# Patient Record
Sex: FEMALE | Race: WHITE | NOT HISPANIC OR LATINO | Employment: FULL TIME | URBAN - METROPOLITAN AREA
[De-identification: names, ages, dates, MRNs, and addresses within clinical notes are randomized per-mention and may not be internally consistent; named-entity substitution may affect disease eponyms.]

---

## 2017-01-06 ENCOUNTER — HOSPITAL ENCOUNTER (INPATIENT)
Facility: MEDICAL CENTER | Age: 22
LOS: 1 days | End: 2017-01-07
Attending: OBSTETRICS & GYNECOLOGY | Admitting: OBSTETRICS & GYNECOLOGY
Payer: MEDICAID

## 2017-01-06 LAB
BASOPHILS # BLD AUTO: 0.2 % (ref 0–1.8)
BASOPHILS # BLD: 0.04 K/UL (ref 0–0.12)
EOSINOPHIL # BLD AUTO: 0.03 K/UL (ref 0–0.51)
EOSINOPHIL NFR BLD: 0.2 % (ref 0–6.9)
ERYTHROCYTE [DISTWIDTH] IN BLOOD BY AUTOMATED COUNT: 41.5 FL (ref 35.9–50)
HCT VFR BLD AUTO: 42.3 % (ref 37–47)
HGB BLD-MCNC: 14.4 G/DL (ref 12–16)
HOLDING TUBE BB 8507: NORMAL
IMM GRANULOCYTES # BLD AUTO: 0.23 K/UL (ref 0–0.11)
IMM GRANULOCYTES NFR BLD AUTO: 1.3 % (ref 0–0.9)
LYMPHOCYTES # BLD AUTO: 1.46 K/UL (ref 1–4.8)
LYMPHOCYTES NFR BLD: 8.3 % (ref 22–41)
MCH RBC QN AUTO: 31.6 PG (ref 27–33)
MCHC RBC AUTO-ENTMCNC: 34 G/DL (ref 33.6–35)
MCV RBC AUTO: 92.8 FL (ref 81.4–97.8)
MONOCYTES # BLD AUTO: 0.99 K/UL (ref 0–0.85)
MONOCYTES NFR BLD AUTO: 5.6 % (ref 0–13.4)
NEUTROPHILS # BLD AUTO: 14.88 K/UL (ref 2–7.15)
NEUTROPHILS NFR BLD: 84.4 % (ref 44–72)
NRBC # BLD AUTO: 0 K/UL
NRBC BLD AUTO-RTO: 0 /100 WBC
PLATELET # BLD AUTO: 243 K/UL (ref 164–446)
PMV BLD AUTO: 10.2 FL (ref 9–12.9)
RBC # BLD AUTO: 4.56 M/UL (ref 4.2–5.4)
WBC # BLD AUTO: 17.6 K/UL (ref 4.8–10.8)

## 2017-01-06 PROCEDURE — 85025 COMPLETE CBC W/AUTO DIFF WBC: CPT

## 2017-01-06 PROCEDURE — A9270 NON-COVERED ITEM OR SERVICE: HCPCS | Performed by: FAMILY MEDICINE

## 2017-01-06 PROCEDURE — 700111 HCHG RX REV CODE 636 W/ 250 OVERRIDE (IP): Performed by: FAMILY MEDICINE

## 2017-01-06 PROCEDURE — 700112 HCHG RX REV CODE 229: Performed by: FAMILY MEDICINE

## 2017-01-06 PROCEDURE — 700111 HCHG RX REV CODE 636 W/ 250 OVERRIDE (IP)

## 2017-01-06 PROCEDURE — 304965 HCHG RECOVERY SERVICES

## 2017-01-06 PROCEDURE — 36415 COLL VENOUS BLD VENIPUNCTURE: CPT

## 2017-01-06 PROCEDURE — 700102 HCHG RX REV CODE 250 W/ 637 OVERRIDE(OP): Performed by: FAMILY MEDICINE

## 2017-01-06 PROCEDURE — 770007 HCHG ROOM/CARE - OB SEMI PRIVATE (*

## 2017-01-06 PROCEDURE — 10907ZC DRAINAGE OF AMNIOTIC FLUID, THERAPEUTIC FROM PRODUCTS OF CONCEPTION, VIA NATURAL OR ARTIFICIAL OPENING: ICD-10-PCS | Performed by: OBSTETRICS & GYNECOLOGY

## 2017-01-06 PROCEDURE — 303615 HCHG EPIDURAL/SPINAL ANESTHESIA FOR LABOR

## 2017-01-06 PROCEDURE — 59409 OBSTETRICAL CARE: CPT

## 2017-01-06 RX ORDER — METHYLERGONOVINE MALEATE 0.2 MG/ML
0.2 INJECTION INTRAVENOUS PRN
Status: DISCONTINUED | OUTPATIENT
Start: 2017-01-06 | End: 2017-01-07 | Stop reason: HOSPADM

## 2017-01-06 RX ORDER — DOCUSATE SODIUM 100 MG/1
100 CAPSULE, LIQUID FILLED ORAL 2 TIMES DAILY PRN
Status: DISCONTINUED | OUTPATIENT
Start: 2017-01-06 | End: 2017-01-07 | Stop reason: HOSPADM

## 2017-01-06 RX ORDER — ONDANSETRON 2 MG/ML
4 INJECTION INTRAMUSCULAR; INTRAVENOUS EVERY 6 HOURS PRN
Status: DISCONTINUED | OUTPATIENT
Start: 2017-01-06 | End: 2017-01-06

## 2017-01-06 RX ORDER — MISOPROSTOL 200 UG/1
1000 TABLET ORAL PRN
Status: DISCONTINUED | OUTPATIENT
Start: 2017-01-06 | End: 2017-01-07 | Stop reason: HOSPADM

## 2017-01-06 RX ORDER — ACETAMINOPHEN 325 MG/1
325 TABLET ORAL EVERY 4 HOURS PRN
Status: DISCONTINUED | OUTPATIENT
Start: 2017-01-06 | End: 2017-01-07 | Stop reason: HOSPADM

## 2017-01-06 RX ORDER — ROPIVACAINE HYDROCHLORIDE 2 MG/ML
INJECTION, SOLUTION EPIDURAL; INFILTRATION; PERINEURAL
Status: COMPLETED
Start: 2017-01-06 | End: 2017-01-06

## 2017-01-06 RX ORDER — BISACODYL 10 MG
10 SUPPOSITORY, RECTAL RECTAL PRN
Status: DISCONTINUED | OUTPATIENT
Start: 2017-01-06 | End: 2017-01-07 | Stop reason: HOSPADM

## 2017-01-06 RX ORDER — ACETAMINOPHEN 325 MG/1
325 TABLET ORAL EVERY 4 HOURS PRN
Status: DISCONTINUED | OUTPATIENT
Start: 2017-01-06 | End: 2017-01-06

## 2017-01-06 RX ORDER — METHYLERGONOVINE MALEATE 0.2 MG/ML
0.2 INJECTION INTRAVENOUS
Status: DISCONTINUED | OUTPATIENT
Start: 2017-01-06 | End: 2017-01-06

## 2017-01-06 RX ORDER — SODIUM CHLORIDE, SODIUM LACTATE, POTASSIUM CHLORIDE, CALCIUM CHLORIDE 600; 310; 30; 20 MG/100ML; MG/100ML; MG/100ML; MG/100ML
INJECTION, SOLUTION INTRAVENOUS CONTINUOUS
Status: DISCONTINUED | OUTPATIENT
Start: 2017-01-06 | End: 2017-01-06

## 2017-01-06 RX ORDER — HYDROCODONE BITARTRATE AND ACETAMINOPHEN 5; 325 MG/1; MG/1
1 TABLET ORAL EVERY 4 HOURS PRN
Status: DISCONTINUED | OUTPATIENT
Start: 2017-01-06 | End: 2017-01-06

## 2017-01-06 RX ORDER — IBUPROFEN 800 MG/1
800 TABLET ORAL EVERY 8 HOURS PRN
Status: DISCONTINUED | OUTPATIENT
Start: 2017-01-06 | End: 2017-01-07 | Stop reason: HOSPADM

## 2017-01-06 RX ORDER — VITAMIN A ACETATE, BETA CAROTENE, ASCORBIC ACID, CHOLECALCIFEROL, .ALPHA.-TOCOPHEROL ACETATE, DL-, THIAMINE MONONITRATE, RIBOFLAVIN, NIACINAMIDE, PYRIDOXINE HYDROCHLORIDE, FOLIC ACID, CYANOCOBALAMIN, CALCIUM CARBONATE, FERROUS FUMARATE, ZINC OXIDE, CUPRIC OXIDE 3080; 12; 120; 400; 1; 1.84; 3; 20; 22; 920; 25; 200; 27; 10; 2 [IU]/1; UG/1; MG/1; [IU]/1; MG/1; MG/1; MG/1; MG/1; MG/1; [IU]/1; MG/1; MG/1; MG/1; MG/1; MG/1
1 TABLET, FILM COATED ORAL EVERY MORNING
Status: DISCONTINUED | OUTPATIENT
Start: 2017-01-07 | End: 2017-01-07 | Stop reason: HOSPADM

## 2017-01-06 RX ORDER — IBUPROFEN 600 MG/1
600 TABLET ORAL EVERY 6 HOURS PRN
Status: DISCONTINUED | OUTPATIENT
Start: 2017-01-06 | End: 2017-01-06

## 2017-01-06 RX ORDER — OXYCODONE AND ACETAMINOPHEN 10; 325 MG/1; MG/1
1 TABLET ORAL EVERY 4 HOURS PRN
Status: DISCONTINUED | OUTPATIENT
Start: 2017-01-06 | End: 2017-01-06

## 2017-01-06 RX ORDER — OXYCODONE HYDROCHLORIDE AND ACETAMINOPHEN 5; 325 MG/1; MG/1
2 TABLET ORAL EVERY 4 HOURS PRN
Status: DISCONTINUED | OUTPATIENT
Start: 2017-01-06 | End: 2017-01-07 | Stop reason: HOSPADM

## 2017-01-06 RX ORDER — ONDANSETRON 2 MG/ML
4 INJECTION INTRAMUSCULAR; INTRAVENOUS EVERY 6 HOURS PRN
Status: DISCONTINUED | OUTPATIENT
Start: 2017-01-06 | End: 2017-01-07 | Stop reason: HOSPADM

## 2017-01-06 RX ORDER — ALUMINA, MAGNESIA, AND SIMETHICONE 2400; 2400; 240 MG/30ML; MG/30ML; MG/30ML
30 SUSPENSION ORAL EVERY 6 HOURS PRN
Status: DISCONTINUED | OUTPATIENT
Start: 2017-01-06 | End: 2017-01-06

## 2017-01-06 RX ORDER — HYDROCODONE BITARTRATE AND ACETAMINOPHEN 5; 325 MG/1; MG/1
1 TABLET ORAL EVERY 4 HOURS PRN
Status: DISCONTINUED | OUTPATIENT
Start: 2017-01-06 | End: 2017-01-07 | Stop reason: HOSPADM

## 2017-01-06 RX ORDER — DEXTROSE, SODIUM CHLORIDE, SODIUM LACTATE, POTASSIUM CHLORIDE, AND CALCIUM CHLORIDE 5; .6; .31; .03; .02 G/100ML; G/100ML; G/100ML; G/100ML; G/100ML
INJECTION, SOLUTION INTRAVENOUS CONTINUOUS
Status: DISCONTINUED | OUTPATIENT
Start: 2017-01-06 | End: 2017-01-06

## 2017-01-06 RX ORDER — MISOPROSTOL 200 UG/1
1000 TABLET ORAL
Status: DISCONTINUED | OUTPATIENT
Start: 2017-01-06 | End: 2017-01-06

## 2017-01-06 RX ADMIN — ROPIVACAINE HYDROCHLORIDE 10 ML/HR: 2 INJECTION, SOLUTION EPIDURAL; INFILTRATION at 11:34

## 2017-01-06 RX ADMIN — ONDANSETRON 4 MG: 2 INJECTION, SOLUTION INTRAMUSCULAR; INTRAVENOUS at 12:42

## 2017-01-06 RX ADMIN — OXYTOCIN 125 ML/HR: 10 INJECTION INTRAVENOUS at 16:27

## 2017-01-06 RX ADMIN — SODIUM CHLORIDE, POTASSIUM CHLORIDE, SODIUM LACTATE AND CALCIUM CHLORIDE: 600; 310; 30; 20 INJECTION, SOLUTION INTRAVENOUS at 09:55

## 2017-01-06 RX ADMIN — DOCUSATE SODIUM 100 MG: 100 CAPSULE ORAL at 19:46

## 2017-01-06 RX ADMIN — IBUPROFEN 800 MG: 800 TABLET, FILM COATED ORAL at 19:47

## 2017-01-06 RX ADMIN — FENTANYL CITRATE 100 MCG: 50 INJECTION, SOLUTION INTRAMUSCULAR; INTRAVENOUS at 10:00

## 2017-01-06 RX ADMIN — SODIUM CHLORIDE, SODIUM LACTATE, POTASSIUM CHLORIDE, CALCIUM CHLORIDE AND DEXTROSE MONOHYDRATE: 5; 600; 310; 30; 20 INJECTION, SOLUTION INTRAVENOUS at 12:42

## 2017-01-06 RX ADMIN — SODIUM CHLORIDE, POTASSIUM CHLORIDE, SODIUM LACTATE AND CALCIUM CHLORIDE: 600; 310; 30; 20 INJECTION, SOLUTION INTRAVENOUS at 10:58

## 2017-01-06 ASSESSMENT — PAIN SCALES - GENERAL
PAINLEVEL_OUTOF10: 4
PAINLEVEL_OUTOF10: 4
PAINLEVEL_OUTOF10: 0

## 2017-01-06 ASSESSMENT — PATIENT HEALTH QUESTIONNAIRE - PHQ9
2. FEELING DOWN, DEPRESSED, IRRITABLE, OR HOPELESS: NOT AT ALL
SUM OF ALL RESPONSES TO PHQ9 QUESTIONS 1 AND 2: 0
SUM OF ALL RESPONSES TO PHQ QUESTIONS 1-9: 0
1. LITTLE INTEREST OR PLEASURE IN DOING THINGS: NOT AT ALL

## 2017-01-06 ASSESSMENT — COPD QUESTIONNAIRES
DO YOU EVER COUGH UP ANY MUCUS OR PHLEGM?: NO/ONLY WITH OCCASIONAL COLDS OR INFECTIONS
DURING THE PAST 4 WEEKS HOW MUCH DID YOU FEEL SHORT OF BREATH: NONE/LITTLE OF THE TIME
HAVE YOU SMOKED AT LEAST 100 CIGARETTES IN YOUR ENTIRE LIFE: NO/DON'T KNOW

## 2017-01-06 ASSESSMENT — LIFESTYLE VARIABLES
EVER_SMOKED: YES
DO YOU DRINK ALCOHOL: NO
ALCOHOL_USE: NO

## 2017-01-06 NOTE — IP AVS SNAPSHOT
After Visit Summary                                                                                                                Sarah Lau   MRN: [id number]    Department:  POST PARTUM 31   2017           Follow-up Information     1. Follow up with Radha Whiting M.D. In 5 weeks.    Specialty:  OB/Gyn    Contact information    [address] [place] [address]  [place] [address]  [phone]         I assume responsibility for securing a follow-up  Screening blood test on my baby within the specified date range.    -                  Discharge Instructions       POSTPARTUM DISCHARGE INSTRUCTIONS FOR MOM    YOB: 1995   Age: 21 y.o.               Admit Date: 2017     Discharge Date: 2017  Attending Doctor:  Navin Altamirano M.D.                  Allergies:  Review of patient's allergies indicates no known allergies.    Discharged to home by car. Discharged via wheelchair, hospital escort: Yes.  Special equipment needed: Not Applicable  Belongings with: Personal  Be sure to schedule a follow-up appointment with your primary care doctor or any specialists as instructed.     Discharge Plan:   Influenza Vaccine Indication: Patient Refuses    REASONS TO CALL YOUR OBSTETRICIAN:  1.   Persistent fever or shaking chills (Temperature higher than 100.4)  2.   Heavy bleeding (soaking more than 1 pad per hour); Passing clots  3.   Foul odor from vagina  4.   Mastitis (Breast infection; breast pain, chills, fever, redness)  5.   Urinary pain, burning or frequency  6.   Episiotomy infection  7.   Abdominal incision infection  8.   Severe depression longer than 24 hours    HAND WASHING  · Prior to handling the baby.  · Before breastfeeding or bottle feeding baby.  · After using the bathroom or changing the baby's diaper.    WOUND CARE  Ask your physician for additional care instructions.  In general:    ·  Incision:      · Keep clean and dry.    · Do NOT lift anything heavier than your baby for up  "to 6 weeks.    · There should not be any opening or pus.      VAGINAL CARE  · Nothing inside vagina for 6 weeks: no sexual intercourse, tampons or douching.  · Bleeding may continue for 2-4 weeks.  Amount may vary.    · Call your physician for heavy bleeding which means soaking more than 1 pad per hour    BIRTH CONTROL  · It is possible to become pregnant at any time after delivery and while breastfeeding.  · Plan to discuss a method of birth control with your physician at your follow up visit. visit.    DIET AND ELIMINATION  · Eating more fiber (bran cereal, fruits, and vegetables) and drinking plenty of fluids will help to avoid constipation.  · Urinary frequency after childbirth is normal.    POSTPARTUM BLUES  During the first few days after birth, you may experience a sense of the \"blues\" which may include impatience, irritability or even crying.  These feeling come and go quickly.  However, as many as 1 in 10 women experience emotional symptoms known as postpartum depression.    Postpartum depression:  May start as early as the second or third day after delivery or take several weeks or months to develop.  Symptoms of \"blues\" are present, but are more intense:  Crying spells; loss of appetite; feelings of hopelessness or loss of control; fear of touching the baby; over concern or no concern at all about the baby; little or no concern about your own appearance/caring for yourself; and/or inability to sleep or excessive sleeping.  Contact your physician if you are experiencing any of these symptoms.    Crisis Hotline:  · [place] Crisis Hotline:  3-862-JVJHBGL  Or 1-464.706.1163  · [place] Crisis Hotline:  1-637.376.3815  Or 543-135-5654    PREVENTING SHAKEN BABY:  If you are angry or stressed, PUT THE BABY IN THE CRIB, step away, take some deep breaths, and wait until you are calm to care for the baby.  DO NOT SHAKE THE BABY.  You are not alone, call a supporter for help.    · Crisis Call Center 24/7 crisis line " "474.664.5053 or 1-136.167.7405  · You can also text them, text \"ANSWER\" to 438438    QUIT SMOKING/TOBACCO USE:  I understand the use of any tobacco products increases my chance of suffering from future heart disease and could cause other illnesses which may shorten my life. Quitting the use of tobacco products is the single most important thing I can do to improve my health. For further information on smoking / tobacco cessation call a Toll Free Quit Line at 1-767.910.8325 (*National Cancer [place]) or 1-734.559.2732 (American Lung Association) or you can access the web based program at www.lungusa.org.    · [place] Tobacco Users Help Line:  (499) 889-4662       Toll Free: 1-961.947.2356  · Quit Tobacco Program [facility] (467)340-4773    DEPRESSION / SUICIDE RISK:  As you are discharged from this [facility], it is important to learn how to keep safe from harming yourself.    Recognize the warning signs:  · Abrupt changes in personality, positive or negative- including increase in energy   · Giving away possessions  · Change in eating patterns- significant weight changes-  positive or negative  · Change in sleeping patterns- unable to sleep or sleeping all the time   · Unwillingness or inability to communicate  · Depression  · Unusual sadness, discouragement and loneliness  · Talk of wanting to die  · Neglect of personal appearance   · Rebelliousness- reckless behavior  · Withdrawal from people/activities they love  · Confusion- inability to concentrate     If you or a loved one observes any of these behaviors or has concerns about self-harm, here's what you can do:  · Talk about it- your feelings and reasons for harming yourself  · Remove any means that you might use to hurt yourself (examples: pills, rope, extension cords, firearm)  · Get professional help from the community (Mental Health, Substance Abuse, psychological counseling)  · Do not be alone:Call your Safe Contact- " someone whom you trust who will be there for you.  · Call your local CRISIS HOTLINE 584-3849 or 739-374-7143  · Call your local Children's Mobile Crisis Response Team [place] (189) 518-0925 or www.CommonFloor  · Call the toll free National Suicide Prevention Hotlines   · National Suicide Prevention Lifeline 772-612-CEZM (9364)  · National Hope Line Network 541-SUICIDE (207-2117)    DISCHARGE SURVEY:  Thank you for choosing [facility].  We hope we provided you with very good care.  You may be receiving a survey in the mail.  Please fill it out.  Your opinion is valuable to us.    ADDITIONAL EDUCATIONAL MATERIALS GIVEN TO PATIENT:        My signature on this form indicates that:  1.  I have reviewed and understand the above information  2.  My questions regarding this information have been answered to my satisfaction.  3.  I have formulated a plan with my discharge nurse to obtain my prescribed medication for home.         Discharge Medication Instructions:    Below are the medications your physician expects you to take upon discharge:    Review all your home medications and newly ordered medications with your doctor and/or pharmacist. Follow medication instructions as directed by your doctor and/or pharmacist.    Please keep your medication list with you and share with your physician.               Medication List      START taking these medications        Instructions     MG Caps   Last time this was given:  100 mg on 1/6/2017  7:46 PM    Take 100 mg by mouth 2 times a day as needed for Constipation.   Dose:  100 mg       ibuprofen 800 MG Tabs   Last time this was given:  800 mg on 1/7/2017  4:29 AM   Commonly known as:  MOTRIN    Take 1 Tab by mouth every 8 hours as needed (Cramping).   Dose:  800 mg         CONTINUE taking these medications        Instructions    PRENATAL VITAMINS PO    Take  by mouth.               Crisis Hotline:     National Crisis Hotline:  9-470-EGYNGQD or  1-461.890.1965    [place] Crisis Hotline:    1-662.343.4778 or 369-431-7665        Disclaimer           _____________________________________                     __________       ________       Patient/Mother Signature or Legal                          Date                   Time

## 2017-01-06 NOTE — DELIVERY NOTE
[facility] SPONTANEOUS VAGINAL DELIVERY PRODEDURE NOTE    PATIENT ID:  NAME:  Sarah Lau  MRN:               [id number]  YOB: 1995    On 2017 at 14:18, this 21 y.o., now 39w5d , GBS negative female delivered via  under spinal anesthesia a viable female infant weight pending  with APGAR scores of 8 and 9 at one and five minutes. There was no nuchal cord/There was a single nuchal cord which was reduced and infant was bulb suctioned at delivery. Cord was doubly clamped, cut and infant handed to RN in attendance. An intact placenta was delivered spontaneously at 15:02 with 3 vessel cord. Upon vaginal exam, there was right labial laceration which did not require any repair. Estimated blood loss was 300cc. Patient will be transferred to postpartum in stable condition and infant to  nursery.    Austen Polo M.D.    Delivery attended by Dr. Suarez who was present for the entire delivery.

## 2017-01-06 NOTE — IP AVS SNAPSHOT
1/7/2017          Sarah Lau  [address]  [place] [address]    Dear Sarah:    [facility] wants to ensure your discharge home is safe and you or your loved ones have had all your questions answered regarding your care after you leave the hospital.    You may receive a telephone call within two days of your discharge.  This call is to make certain you understand your discharge instructions as well as ensure we provided you with the best care possible during your stay with us.     The call will only last approximately 3-5 minutes and will be done by a nurse.    Once again, we want to ensure your discharge home is safe and that you have a clear understanding of any next steps in your care.  If you have any questions or concerns, please do not hesitate to contact us, we are here for you.  Thank you for choosing [facility] for your healthcare needs.    Sincerely,    Man Al    [facility]

## 2017-01-06 NOTE — H&P
[facility] LABOR AND DELIVERY HISTORY AND PHYSICAL    PATIENT ID:  NAME:  Sarah Lau  MRN:               [id number]  YOB: 1995    CC:  CTX    HPI:  Sarah Lau is a 21 y.o. female  at 39w5d by a 14 week ultrasound/LMP on Patient's last menstrual period was 2016.. Estimated Date of Delivery: 17  Patient presents complaining of positive uterine contractions, with negative loss of fluid.  Good fetal movement.  negative vaginal bleeding.  Pregnancy was uncomplicated thus far.    ROS: Patient denies any fever chills, nausea, vomiting, headache, chest pain, shortness of breath, or dysuria or unusual swelling of hands or feet.     Prenatal Care: Obtained at [facility], 1st visit @30w (transfer from [facility] [place] [facility]) with 8 total visits.  Third trimester BPs were approximately 110-120s/70s.  Total weight gain 24lbs during the pregnancy.    Prenatal Labs:   HepBsAg: Neg HIV: NR Rubella: immune   RPR: NR PAP: ASCUS GBS: Neg   GC/CT: Neg A+/ Ab neg Quad Screen: normal   No results for input(s): WBC, RBC, HEMOGLOBIN, HEMATOCRIT, MCV, MCH, RDW, PLATELETCT, MPV, NEUTSPOLYS, LYMPHOCYTES, MONOCYTES, EOSINOPHILS, BASOPHILS, RBCMORPHOLO in the last 72 hours.  No results for input(s): SODIUM, POTASSIUM, CHLORIDE, CO2, GLUCOSE, BUN, CPKTOTAL in the last 72 hours.       IMAGING:   OB ultrasound:   2016 2:37 PM    HISTORY/REASON FOR EXAM:  Followup CPCs    TECHNIQUE/EXAM DESCRIPTION: OB limited ultrasound.    COMPARISON:  Outside obstetrical ultrasound 2016    FINDINGS:  Fetal Lie:  Vertex    Placenta (Location):  Anterior  Placenta Previa: No    Amniotic Fluid Volume:  FLOR = 18.22 cm    Fetal Heart Rate:  154 bpm    No maternal adnexal mass is identified.    Fetal Biometry  BPD    8.57 cm, 34 weeks, 4 days  HC    31.14 cm, 34 weeks, 6 days  AC    31.61 cm, 35 weeks, 4 days  Femur Length    6.66 cm, 34 weeks, 2 days  Humerus Length    5.89 cm, 34 weeks, 1 day    EGA by this US:  34 weeks, 5 days  AMADO by this US:  "2017  AMADO by 1st US:  2017 - [facility]    Estimated Fetal Weight:  2574 grams +/- 376g    Comments:  No choroid plexus cysts identified.         Impression        Single intrauterine pregnancy of an estimated gestational age of 34 weeks, 5 days with an estimated date of delivery of 2017.    Size less than dates.         POB Hx:  OB History    Para Term  AB SAB TAB Ectopic Multiple Living   1               # Outcome Date GA Lbr Nick/2nd Weight Sex Delivery Anes PTL Lv   1 Current                   PMH/Problem List:    Past Medical History   Diagnosis Date   • Asthma 2006     no medications   • Migraine      no medications     Patient Active Problem List    Diagnosis Date Noted   • Encounter for supervision of normal first pregnancy in third trimester 2016       Current Outpatient Medications:  No current facility-administered medications on file prior to encounter.     Current Outpatient Prescriptions on File Prior to Encounter   Medication Sig Dispense Refill   • Prenatal Multivit-Min-Fe-FA (PRENATAL VITAMINS PO) Take  by mouth.         PSH:    No past surgical history on file.    Allergies:   No Known Allergies    SH:  Social History     Social History   • Marital Status: Single     Spouse Name: N/A   • Number of Children: N/A   • Years of Education: N/A     Occupational History   • Not on file.     Social History Main Topics   • Smoking status: Never Smoker    • Smokeless tobacco: Never Used   • Alcohol Use: No   • Drug Use: No   • Sexual Activity:     Partners: Male      Comment: none. Planned pregnancy     Other Topics Concern   • Not on file     Social History Narrative         PHYSICAL EXAM:  Filed Vitals:    17 0700 17 0800   BP:  123/75   Pulse:  88   Temp:  36.4 °C (97.6 °F)   TempSrc:  Temporal   Resp:  18   Height: 1.676 m (5' 6\")    Weight: 63.957 kg (141 lb)      Temp (24hrs), Av.4 °C (97.6 °F), Min:36.4 °C (97.6 °F), Max:36.4 °C (97.6 °F)    General: No acute " distress, resting comfortably in bed.  HEENT: normocephalic, nontraumatic, PERRLA, EOMI  Cardiovascular: Heart RRR with no murmurs, rubs or gallops. Distal Pulses 2+  Respiratory: symmetric chest expansion, lungs CTA bilaterally with no wheezes rales or rhonci  Abdomen: gravid, nontender  Musculoskeletal: strength 5/5 in four extremities  Neuro: non focal with no numbness, tingling or changes in sensation    SVE: 4/100%/-1 with BBOW  [place]: Q5 minutes; EFM: 140s with accels, mod variability    A/P: Intrauterine pregancy at 39w5d weeks in active labor labor here for anticipated .      Patient Active Problem List    Diagnosis Date Noted   • Encounter for supervision of normal first pregnancy in third trimester 2016       1. IUP at term  2. Patient is GBS neg  3. Anticipating     Austen Polo M.D.

## 2017-01-06 NOTE — PROGRESS NOTES
"[facility] LABOR AND DELIVERY PROGRESS NOTE    PATIENT ID:  NAME:  Sarah Lau  MRN:               [id number]  YOB: 1995     21 y.o. female  at 39w5d.    Subjective: Doing well, comfortable, s/p epidural    Objective:    Filed Vitals:    17 0700 17 0800   BP:  123/75   Pulse:  88   Temp:  36.4 °C (97.6 °F)   TempSrc:  Temporal   Resp:  18   Height: 1.676 m (5' 6\")    Weight: 63.957 kg (141 lb)        Cervix:  7cm/90%/-1  [place]: Uterine Contractions Q2-4 minutes.   FHRM: Baseline 140, Accels, no decels, reassuring tracing  Pitocin: none  Pain control: adequate    Assessment: 21 y.o. female    at 39w5d.    Plan:   1. Continue current management  2. Anticipate  delivery  3. AROM @ 1200     Austen Polo M.D.    "

## 2017-01-06 NOTE — IP AVS SNAPSHOT
Zova Access Code: Activation code not generated  Current Zova Status: Patient Declined    [employer]  A secure, online tool to manage your health information     [employer]’s Zova® is a secure, online tool that connects you to your personalized health information from the privacy of your home -- day or night - making it very easy for you to manage your healthcare. Once the activation process is completed, you can even access your medical information using the Zova chuy, which is available for free in the Apple Chuy store or Google Play store.     Zova provides the following levels of access (as shown below):   My Chart Features   [facility] Primary Care Doctor [facility]  Specialists [facility]  Urgent  Care Non-[facility]  Primary Care  Doctor   Email your healthcare team securely and privately 24/7 X X X X   Manage appointments: schedule your next appointment; view details of past/upcoming appointments X      Request prescription refills. X      View recent personal medical records, including lab and immunizations X X X X   View health record, including health history, allergies, medications X X X X   Read reports about your outpatient visits, procedures, consult and ER notes X X X X   See your discharge summary, which is a recap of your hospital and/or ER visit that includes your diagnosis, lab results, and care plan. X X       How to register for Zova:  1. Go to  [email or web address].  2. Click on the Sign Up Now box, which takes you to the New Member Sign Up page. You will need to provide the following information:  a. Enter your Zova Access Code exactly as it appears at the top of this page. (You will not need to use this code after you’ve completed the sign-up process. If you do not sign up before the expiration date, you must request a new code.)   b. Enter your date of birth.   c. Enter your home email address.   d. Click Submit, and follow the next screen’s instructions.  3. Create a Zova ID.  This will be your [employer] login ID and cannot be changed, so think of one that is secure and easy to remember.  4. Create a [employer] password. You can change your password at any time.  5. Enter your Password Reset Question and Answer. This can be used at a later time if you forget your password.   6. Enter your e-mail address. This allows you to receive e-mail notifications when new information is available in [employer].  7. Click Sign Up. You can now view your health information.    For assistance activating your [employer] account, call [phone]

## 2017-01-06 NOTE — PROGRESS NOTES
, due , 39.5 weeks  0945: Report received from JEFFREY Turcios RN. POC discussed. Pt admitted for active labor. Admission procedures completed. Pt requesting an epidural. IVF bolus started, see MAR. VSS. FOB at bedside.  1117: Dr. Hankins at bedside to place epidural.  1202: Dr. Polo at bedside. SVE by MD, /-1. AROM, clear. Pt comfortable with epidural.  1405: Dr. Polo at bedside. SVE by MD, Anterior lip/+2.  1430: Pt called out reporting pressure. SVE by RN, C/+2. Pt placed in stirrups. Begin pushing with RN at bedside.  1447: Dr. Polo & Dr. Suarez at bedside for delivery.   1453:  of viable female infant, 8/9 APGARs, 3VC.  1502: Placenta delivered S/I. No lacerations per MD. Infant skin-to-skin with pulse-ox. Lunch tray ordered.  1725: Pt up to bathroom without difficulty, voided. Fundus/lochia WNL.   1735: Pt and infant transferred to  332 via wheelchair. Report given to DASIA Pierson. POC discussed.

## 2017-01-06 NOTE — PROGRESS NOTES
0745: pt to lda 2, pt c/o uc's starting at 5 am this morning with some spotting and mucous discharge.  efm and toco applied. Vss.  sve 4/100/-1 with bbow.  Report to dr. Polo.    0830: pt to ambulate for 30 minutes per md orders  0900: pt back to bed, sve 5/100/-1 with bbow.  Report to dr. Polo.  Orders to admit to labor and delivery. Pt to room 220.  Report to bharti caldwell rn

## 2017-01-06 NOTE — PROGRESS NOTES
[facility] LABOR AND DELIVERY PROGRESS NOTE    PATIENT ID:  NAME:  Sarah Lau  MRN:               [id number]  YOB: 1995     21 y.o. female  at 39w5d.    Subjective: Doing well, comfortable, not yet feeling pressure.    Objective:    Filed Vitals:    17 1148 17 1151 17 1154 17 1157   BP: 123/58 120/59 120/59 118/56   Pulse: 75 79 70 76   Temp:       TempSrc:       Resp:       Height:       Weight:       SpO2:           Cervix:  complete with anterior lip/100%/+2  [place]: Uterine Contractions Q3 minutes.   FHRM: Baseline 130s, Accels, no decels, reassuring tracing  Pitocin: none  Pain control: good    Assessment: 21 y.o. female    at 39w5d.    Plan:   1. Continue current management  2. Anticipate  delivery       Austen Polo M.D.

## 2017-01-07 VITALS
TEMPERATURE: 97.9 F | DIASTOLIC BLOOD PRESSURE: 74 MMHG | OXYGEN SATURATION: 95 % | HEART RATE: 76 BPM | RESPIRATION RATE: 17 BRPM | SYSTOLIC BLOOD PRESSURE: 103 MMHG | BODY MASS INDEX: 22.66 KG/M2 | WEIGHT: 141 LBS | HEIGHT: 66 IN

## 2017-01-07 LAB
ERYTHROCYTE [DISTWIDTH] IN BLOOD BY AUTOMATED COUNT: 44.4 FL (ref 35.9–50)
HCT VFR BLD AUTO: 35.1 % (ref 37–47)
HGB BLD-MCNC: 11.6 G/DL (ref 12–16)
MCH RBC QN AUTO: 31.4 PG (ref 27–33)
MCHC RBC AUTO-ENTMCNC: 32.5 G/DL (ref 33.6–35)
MCV RBC AUTO: 96.7 FL (ref 81.4–97.8)
PLATELET # BLD AUTO: 208 K/UL (ref 164–446)
PMV BLD AUTO: 10.5 FL (ref 9–12.9)
RBC # BLD AUTO: 3.63 M/UL (ref 4.2–5.4)
WBC # BLD AUTO: 13.4 K/UL (ref 4.8–10.8)

## 2017-01-07 PROCEDURE — 700112 HCHG RX REV CODE 229: Performed by: FAMILY MEDICINE

## 2017-01-07 PROCEDURE — 85027 COMPLETE CBC AUTOMATED: CPT

## 2017-01-07 PROCEDURE — A9270 NON-COVERED ITEM OR SERVICE: HCPCS | Performed by: FAMILY MEDICINE

## 2017-01-07 PROCEDURE — 700102 HCHG RX REV CODE 250 W/ 637 OVERRIDE(OP): Performed by: FAMILY MEDICINE

## 2017-01-07 PROCEDURE — 36415 COLL VENOUS BLD VENIPUNCTURE: CPT

## 2017-01-07 RX ORDER — PSEUDOEPHEDRINE HCL 30 MG
100 TABLET ORAL 2 TIMES DAILY PRN
Qty: 60 CAP | Refills: 2 | Status: SHIPPED | OUTPATIENT
Start: 2017-01-07

## 2017-01-07 RX ORDER — IBUPROFEN 800 MG/1
800 TABLET ORAL EVERY 8 HOURS PRN
Qty: 30 TAB | Refills: 2 | Status: SHIPPED | OUTPATIENT
Start: 2017-01-07

## 2017-01-07 RX ADMIN — HYDROCODONE BITARTRATE AND ACETAMINOPHEN 1 TABLET: 5; 325 TABLET ORAL at 14:05

## 2017-01-07 RX ADMIN — IBUPROFEN 800 MG: 800 TABLET, FILM COATED ORAL at 04:29

## 2017-01-07 RX ADMIN — HYDROCODONE BITARTRATE AND ACETAMINOPHEN 1 TABLET: 5; 325 TABLET ORAL at 19:30

## 2017-01-07 RX ADMIN — DOCUSATE SODIUM 100 MG: 100 CAPSULE ORAL at 19:30

## 2017-01-07 RX ADMIN — HYDROCODONE BITARTRATE AND ACETAMINOPHEN 1 TABLET: 5; 325 TABLET ORAL at 00:00

## 2017-01-07 RX ADMIN — IBUPROFEN 800 MG: 800 TABLET, FILM COATED ORAL at 14:05

## 2017-01-07 RX ADMIN — Medication 1 TABLET: at 08:31

## 2017-01-07 RX ADMIN — HYDROCODONE BITARTRATE AND ACETAMINOPHEN 1 TABLET: 5; 325 TABLET ORAL at 04:29

## 2017-01-07 RX ADMIN — HYDROCODONE BITARTRATE AND ACETAMINOPHEN 1 TABLET: 5; 325 TABLET ORAL at 08:31

## 2017-01-07 ASSESSMENT — PAIN SCALES - GENERAL
PAINLEVEL_OUTOF10: 3
PAINLEVEL_OUTOF10: 3
PAINLEVEL_OUTOF10: 5
PAINLEVEL_OUTOF10: 2
PAINLEVEL_OUTOF10: 3
PAINLEVEL_OUTOF10: 5
PAINLEVEL_OUTOF10: 4

## 2017-01-07 NOTE — PROGRESS NOTES
Spoke with Rocio ALFARO about infiltrated IV.  Orders to stop IV and education patient on bleeding.

## 2017-01-07 NOTE — PROGRESS NOTES
Patient assessment done.  Patient states that she will ask for pain medication when needed.  Pain assessment and patient condition will continue to be monitored.

## 2017-01-07 NOTE — PROGRESS NOTES
Patient arrived on unit at 1745 with infant and L&D RN in wheelchair. Assessment completed. VSS. Patient states pain 0/10; declines medication. Tucks provided. Fundus firm; lochia scant. Patient oriented to room; verbalizes understanding. Plan of care discussed. All questions and concerns addressed. Call light demonstrated. Bed in lowest, locked position. Will continue to monitor.

## 2017-01-07 NOTE — CARE PLAN
Problem: Pain Management  Goal: Pain level will decrease to patient’s comfort goal  Outcome: PROGRESSING AS EXPECTED  Pt is comfortable at this time.

## 2017-01-07 NOTE — DISCHARGE INSTRUCTIONS
POSTPARTUM DISCHARGE INSTRUCTIONS FOR MOM    YOB: 1995   Age: 21 y.o.               Admit Date: 2017     Discharge Date: 2017  Attending Doctor:  Navin Altamirano M.D.                  Allergies:  Review of patient's allergies indicates no known allergies.    Discharged to home by car. Discharged via wheelchair, hospital escort: Yes.  Special equipment needed: Not Applicable  Belongings with: Personal  Be sure to schedule a follow-up appointment with your primary care doctor or any specialists as instructed.     Discharge Plan:   Influenza Vaccine Indication: Patient Refuses    REASONS TO CALL YOUR OBSTETRICIAN:  1.   Persistent fever or shaking chills (Temperature higher than 100.4)  2.   Heavy bleeding (soaking more than 1 pad per hour); Passing clots  3.   Foul odor from vagina  4.   Mastitis (Breast infection; breast pain, chills, fever, redness)  5.   Urinary pain, burning or frequency  6.   Episiotomy infection  7.   Abdominal incision infection  8.   Severe depression longer than 24 hours    HAND WASHING  · Prior to handling the baby.  · Before breastfeeding or bottle feeding baby.  · After using the bathroom or changing the baby's diaper.    WOUND CARE  Ask your physician for additional care instructions.  In general:    ·  Incision:      · Keep clean and dry.    · Do NOT lift anything heavier than your baby for up to 6 weeks.    · There should not be any opening or pus.      VAGINAL CARE  · Nothing inside vagina for 6 weeks: no sexual intercourse, tampons or douching.  · Bleeding may continue for 2-4 weeks.  Amount may vary.    · Call your physician for heavy bleeding which means soaking more than 1 pad per hour    BIRTH CONTROL  · It is possible to become pregnant at any time after delivery and while breastfeeding.  · Plan to discuss a method of birth control with your physician at your follow up visit. visit.    DIET AND ELIMINATION  · Eating more fiber (bran cereal, fruits, and  "vegetables) and drinking plenty of fluids will help to avoid constipation.  · Urinary frequency after childbirth is normal.    POSTPARTUM BLUES  During the first few days after birth, you may experience a sense of the \"blues\" which may include impatience, irritability or even crying.  These feeling come and go quickly.  However, as many as 1 in 10 women experience emotional symptoms known as postpartum depression.    Postpartum depression:  May start as early as the second or third day after delivery or take several weeks or months to develop.  Symptoms of \"blues\" are present, but are more intense:  Crying spells; loss of appetite; feelings of hopelessness or loss of control; fear of touching the baby; over concern or no concern at all about the baby; little or no concern about your own appearance/caring for yourself; and/or inability to sleep or excessive sleeping.  Contact your physician if you are experiencing any of these symptoms.    Crisis Hotline:  · [place] Crisis Hotline:  9-509-SNTUGUA  Or 1-336.391.7011  · [place] Crisis Hotline:  1-876.950.9485  Or 287-847-1710    PREVENTING SHAKEN BABY:  If you are angry or stressed, PUT THE BABY IN THE CRIB, step away, take some deep breaths, and wait until you are calm to care for the baby.  DO NOT SHAKE THE BABY.  You are not alone, call a supporter for help.    · Crisis [facility] Center 24/7 crisis line 336-958-6139 or 1-551.920.5856  · You can also text them, text \"ANSWER\" to 519154    QUIT SMOKING/TOBACCO USE:  I understand the use of any tobacco products increases my chance of suffering from future heart disease and could cause other illnesses which may shorten my life. Quitting the use of tobacco products is the single most important thing I can do to improve my health. For further information on smoking / tobacco cessation call a Toll Free Quit Line at 1-381.511.3699 (*[employer]) or 1-550.183.2887 (American [employer] Association) or you can access the web " based program at www.lungusa.org.    · [place] Tobacco Users Help Line:  (457) 559-2177       Toll Free: 1-905.375.4914  · Quit Tobacco Program [facility] (535)616-9866    DEPRESSION / SUICIDE RISK:  As you are discharged from this [facility], it is important to learn how to keep safe from harming yourself.    Recognize the warning signs:  · Abrupt changes in personality, positive or negative- including increase in energy   · Giving away possessions  · Change in eating patterns- significant weight changes-  positive or negative  · Change in sleeping patterns- unable to sleep or sleeping all the time   · Unwillingness or inability to communicate  · Depression  · Unusual sadness, discouragement and loneliness  · Talk of wanting to die  · Neglect of personal appearance   · Rebelliousness- reckless behavior  · Withdrawal from people/activities they love  · Confusion- inability to concentrate     If you or a loved one observes any of these behaviors or has concerns about self-harm, here's what you can do:  · Talk about it- your feelings and reasons for harming yourself  · Remove any means that you might use to hurt yourself (examples: pills, rope, extension cords, firearm)  · Get professional help from the community (Mental Health, Substance Abuse, psychological counseling)  · Do not be alone:Call your Safe Contact- someone whom you trust who will be there for you.  · Call your local CRISIS HOTLINE 010-0415 or 254-405-8363  · Call your local Children's Mobile Crisis Response Team [place] (486) 205-3791 or www.[employer]  · Call the toll free National Suicide Prevention Hotlines   · National Suicide Prevention Lifeline 818-768-TFKO (5426)  · National Hope Line Network 800-SUICIDE (238-2785)    DISCHARGE SURVEY:  Thank you for choosing [facility].  We hope we provided you with very good care.  You may be receiving a survey in the mail.  Please fill it out.  Your opinion is  valuable to us.    ADDITIONAL EDUCATIONAL MATERIALS GIVEN TO PATIENT:        My signature on this form indicates that:  1.  I have reviewed and understand the above information  2.  My questions regarding this information have been answered to my satisfaction.  3.  I have formulated a plan with my discharge nurse to obtain my prescribed medication for home.

## 2017-01-07 NOTE — CARE PLAN
Problem: Altered physiologic condition related to immediate post-delivery state and potential for bleeding/hemorrhage  Goal: Patient physiologically stable as evidenced by normal lochia, palpable uterine involution and vital signs within normal limits  Outcome: PROGRESSING AS EXPECTED  Patient has light lochia with firm palpable uterus.  Vital signs are within defined limits.  Assessment will continue.     Problem: Potential for postpartum infection related to presence of episiotomy/vaginal tear and/or uterine contamination  Goal: Patient will be absent from signs and symptoms of infection  Outcome: PROGRESSING AS EXPECTED  Patient is free from signs and symptoms of infection at this time.  Assessment will continue.

## 2017-01-07 NOTE — CARE PLAN
Problem: Infection  Goal: Will remain free from infection  Outcome: PROGRESSING SLOWER THAN EXPECTED  Vital signs remain stable. Will continue to monitor.

## 2017-01-08 NOTE — PROGRESS NOTES
1642- Report received from DASIA Morrison.  Care assumed.    1730- Discharge education discussed with patient, patient verbalized understanding.  Prescriptions given to patient.

## 2017-01-08 NOTE — PROGRESS NOTES
Report received from Carleen FORMAN at 1915 that patient is ready for discharge and was just waiting for FOB to come back and bring car seat. Discharge papers have already been signed and infant just needed to be checked for proper car seat fitting. Transponder removed at 2010 and infant checked for proper restraints in car seat. Restraints needed adjustment as they were too loose and FOB was concerned that they were too tight. Patient in wheelchair ,infant properly restrained in car seat and FOB escorted out to vehicle in parking garage by SUNNY Hussein

## 2019-04-17 NOTE — DISCHARGE SUMMARY
Addended by: AZAEL CESPEDES on: 4/17/2019 09:04 AM     Modules accepted: Orders     [facility]  NORMAL SPONTANEOUS VAGINAL DISCHARGE SUMMARY    PATIENT ID:  NAME:  Sarah Lau  MRN:               [id number]  YOB: 1995    DATE OF ADMISSION: 2017    DATE OF DISCHARGE: 2017     ADMITTING DIAGNOSIS: Intrauterine pregnancy at 39w5d.    DISCHARGE DIAGNOSIS: s/p     HOSPITAL COURSE: This is a 21 y.o. year old female admitted at  at 39w5d who presented with postive contractions, negative LOF, negatvie vaginal bleeding, good FM and in active labor. Pt was 5 cm dilated, 100% effaced and at  -1 with BBOW station on sterile vaginal exam. Pregnancy was uncomplicated. The patient had a good labor pattern after admission and proceeded to deliver a viable female infant weighing  6 lbs and 11 oz. Infants Apgars scores were 8 and 9 at one and five minutes. The patients postpartum course was uncomplicated and she was discharged home in stable condition on postpartum day #1.    PROCEDURES PERFORMED: Normal spontaneous vaginal delivery over intact  . Upon vaginal exam a small left labial laceration which did not require any repair    COMPLICATIONS: none    DIET: regular    ACTIVITY: No intercourse and nothing inserted into the vagina for 5 weeks.    EXAM:  General: Well appearing, NAD  Breast: No significant tenderness or erythem  CV: RRR, no m/r/g  Pulm: CTAB  Abdomen: Fundus firm, otherwise soft with + BS  Lochia: light  Ext: no significant edema, clubbing, or cyanosis      MEDICATIONS:  Current Outpatient Prescriptions   Medication Sig Dispense Refill   • ibuprofen (MOTRIN) 800 MG Tab Take 1 Tab by mouth every 8 hours as needed (Cramping). 30 Tab 2   • docusate sodium 100 MG Cap Take 100 mg by mouth 2 times a day as needed for Constipation. 60 Cap 2         FOLLOWUP:  1) The pregnancy center in 5 weeks  2) Return to the hospital if copious vaginal bleeding or foul smelling discharge is noted    Austen Polo M.D.

## 2020-02-05 NOTE — PROGRESS NOTES
Bedside shift change report given to Mustapha Lea (oncoming nurse) by Kesha Barrios (offgoing nurse). Report included the following information SBAR, Kardex, Intake/Output, MAR, Accordion, Recent Results and Med Rec Status. Pt up to shower. Tolerated well.